# Patient Record
Sex: MALE | Race: WHITE
[De-identification: names, ages, dates, MRNs, and addresses within clinical notes are randomized per-mention and may not be internally consistent; named-entity substitution may affect disease eponyms.]

---

## 2022-03-19 PROBLEM — F84.5 ASPERGER SYNDROME: Status: ACTIVE | Noted: 2020-02-20

## 2022-03-19 PROBLEM — F95.2 TOURETTE SYNDROME: Status: ACTIVE | Noted: 2020-02-20

## 2022-09-22 DIAGNOSIS — Z13.228 SCREENING FOR METABOLIC DISORDER: ICD-10-CM

## 2022-09-22 DIAGNOSIS — Z13.6 SCREENING FOR ISCHEMIC HEART DISEASE: ICD-10-CM

## 2022-09-22 DIAGNOSIS — R94.6 NONSPECIFIC ABNORMAL RESULTS OF THYROID FUNCTION STUDY: ICD-10-CM

## 2022-09-22 DIAGNOSIS — R79.9 ABNORMAL BLOOD CHEMISTRY: ICD-10-CM

## 2022-09-22 DIAGNOSIS — Z12.5 SCREENING PSA (PROSTATE SPECIFIC ANTIGEN): ICD-10-CM

## 2022-09-22 DIAGNOSIS — Z12.5 SCREENING PSA (PROSTATE SPECIFIC ANTIGEN): Primary | ICD-10-CM

## 2022-09-22 LAB
ALBUMIN SERPL-MCNC: 4.5 G/DL (ref 3.5–5)
ALBUMIN/GLOB SERPL: 1.6 {RATIO} (ref 1.2–3.5)
ALP SERPL-CCNC: 46 U/L (ref 50–136)
ALT SERPL-CCNC: 24 U/L (ref 12–65)
ANION GAP SERPL CALC-SCNC: 4 MMOL/L (ref 4–13)
AST SERPL-CCNC: 12 U/L (ref 15–37)
BASOPHILS # BLD: 0.1 K/UL (ref 0–0.2)
BASOPHILS NFR BLD: 1 % (ref 0–2)
BILIRUB SERPL-MCNC: 0.5 MG/DL (ref 0.2–1.1)
BUN SERPL-MCNC: 12 MG/DL (ref 6–23)
CALCIUM SERPL-MCNC: 9.6 MG/DL (ref 8.3–10.4)
CHLORIDE SERPL-SCNC: 106 MMOL/L (ref 101–110)
CHOLEST SERPL-MCNC: 137 MG/DL
CO2 SERPL-SCNC: 30 MMOL/L (ref 21–32)
CREAT SERPL-MCNC: 0.6 MG/DL (ref 0.8–1.5)
DIFFERENTIAL METHOD BLD: NORMAL
EOSINOPHIL # BLD: 0.3 K/UL (ref 0–0.8)
EOSINOPHIL NFR BLD: 4 % (ref 0.5–7.8)
ERYTHROCYTE [DISTWIDTH] IN BLOOD BY AUTOMATED COUNT: 12.9 % (ref 11.9–14.6)
EST. AVERAGE GLUCOSE BLD GHB EST-MCNC: 108 MG/DL
GLOBULIN SER CALC-MCNC: 2.9 G/DL (ref 2.3–3.5)
GLUCOSE SERPL-MCNC: 113 MG/DL (ref 65–100)
HBA1C MFR BLD: 5.4 % (ref 4.8–5.6)
HCT VFR BLD AUTO: 46.1 % (ref 41.1–50.3)
HDLC SERPL-MCNC: 42 MG/DL (ref 40–60)
HDLC SERPL: 3.3 {RATIO}
HGB BLD-MCNC: 14.8 G/DL (ref 13.6–17.2)
IMM GRANULOCYTES # BLD AUTO: 0 K/UL (ref 0–0.5)
IMM GRANULOCYTES NFR BLD AUTO: 0 % (ref 0–5)
LDLC SERPL CALC-MCNC: 83.2 MG/DL
LYMPHOCYTES # BLD: 1.7 K/UL (ref 0.5–4.6)
LYMPHOCYTES NFR BLD: 25 % (ref 13–44)
MCH RBC QN AUTO: 29.5 PG (ref 26.1–32.9)
MCHC RBC AUTO-ENTMCNC: 32.1 G/DL (ref 31.4–35)
MCV RBC AUTO: 92 FL (ref 79.6–97.8)
MONOCYTES # BLD: 0.8 K/UL (ref 0.1–1.3)
MONOCYTES NFR BLD: 12 % (ref 4–12)
NEUTS SEG # BLD: 3.9 K/UL (ref 1.7–8.2)
NEUTS SEG NFR BLD: 58 % (ref 43–78)
NRBC # BLD: 0 K/UL (ref 0–0.2)
PLATELET # BLD AUTO: 190 K/UL (ref 150–450)
PMV BLD AUTO: 11.7 FL (ref 9.4–12.3)
POTASSIUM SERPL-SCNC: 4.5 MMOL/L (ref 3.5–5.1)
PROT SERPL-MCNC: 7.4 G/DL (ref 6.3–8.2)
RBC # BLD AUTO: 5.01 M/UL (ref 4.23–5.6)
SODIUM SERPL-SCNC: 140 MMOL/L (ref 138–145)
T4 FREE SERPL-MCNC: 0.9 NG/DL (ref 0.78–1.46)
TRIGL SERPL-MCNC: 59 MG/DL (ref 35–150)
TSH, 3RD GENERATION: 1.51 UIU/ML (ref 0.36–3.74)
VLDLC SERPL CALC-MCNC: 11.8 MG/DL (ref 6–23)
WBC # BLD AUTO: 6.7 K/UL (ref 4.3–11.1)

## 2022-09-23 LAB
PSA FREE MFR SERPL: 16.7 %
PSA FREE SERPL-MCNC: 0.1 NG/ML
PSA SERPL-MCNC: 0.6 NG/ML

## 2022-09-29 ENCOUNTER — OFFICE VISIT (OUTPATIENT)
Dept: PRIMARY CARE CLINIC | Facility: CLINIC | Age: 37
End: 2022-09-29
Payer: COMMERCIAL

## 2022-09-29 VITALS
BODY MASS INDEX: 32.1 KG/M2 | OXYGEN SATURATION: 96 % | HEIGHT: 70 IN | HEART RATE: 68 BPM | SYSTOLIC BLOOD PRESSURE: 111 MMHG | TEMPERATURE: 97.3 F | WEIGHT: 224.2 LBS | DIASTOLIC BLOOD PRESSURE: 80 MMHG

## 2022-09-29 DIAGNOSIS — F95.2 TOURETTE SYNDROME: ICD-10-CM

## 2022-09-29 DIAGNOSIS — F84.5 ASPERGER SYNDROME: Primary | ICD-10-CM

## 2022-09-29 PROCEDURE — 99214 OFFICE O/P EST MOD 30 MIN: CPT | Performed by: FAMILY MEDICINE

## 2022-09-29 RX ORDER — RISPERIDONE 0.5 MG/1
0.5 TABLET, FILM COATED ORAL DAILY
Qty: 30 TABLET | Refills: 11 | Status: SHIPPED | OUTPATIENT
Start: 2022-09-29

## 2022-09-29 RX ORDER — RISPERIDONE 0.5 MG/1
0.5 TABLET, FILM COATED ORAL DAILY
Qty: 30 TABLET | Refills: 5 | Status: SHIPPED | OUTPATIENT
Start: 2022-09-29 | End: 2022-09-29 | Stop reason: SDUPTHER

## 2022-09-29 RX ORDER — RISPERIDONE 1 MG/1
1 TABLET, FILM COATED ORAL DAILY
Qty: 30 TABLET | Refills: 11 | Status: SHIPPED | OUTPATIENT
Start: 2022-09-29

## 2022-09-29 RX ORDER — RISPERIDONE 1 MG/1
1 TABLET, FILM COATED ORAL DAILY
Qty: 30 TABLET | Refills: 5 | Status: SHIPPED | OUTPATIENT
Start: 2022-09-29 | End: 2022-09-29 | Stop reason: SDUPTHER

## 2022-09-29 ASSESSMENT — PATIENT HEALTH QUESTIONNAIRE - PHQ9
SUM OF ALL RESPONSES TO PHQ9 QUESTIONS 1 & 2: 0
2. FEELING DOWN, DEPRESSED OR HOPELESS: 0
SUM OF ALL RESPONSES TO PHQ QUESTIONS 1-9: 0
1. LITTLE INTEREST OR PLEASURE IN DOING THINGS: 0

## 2022-09-29 NOTE — PROGRESS NOTES
Zachariah Lozoya MD  [address] [place] [address]  Ph No:  [phone]  Fax:  [phone]    CHIEF COMPLAINT:  Chief Complaint   Patient presents with    Follow-up     Pt in for 6 month follow up. Pt has no issues today. HISTORY OF PRESENT ILLNESS:  Mr. Mehreen Pena is a 40 y.o. male. Pt is doing okay 1/2 pill in am and 1 pill, Pt says helps calm. Pt works as a  at [employer] and reports he is enjoying his job. Pt was brought back to clinic to retest of glucose and with weight loss pt has reduced the hgba1c from 6.0% to 5.4%. Pt is now living as a non-diabetic. Pt has lost to 230lbs to 220lbs. Pt is advised his other labs are normal.    Pt is advised we will see him again in 1 year. Pt is given refills on risperdal.  Pt will RTC in 1 year for recheck, lab review: cmp, lipid, cbc, tsh, free t4, hgba1c, vit d. HISTORY:  Allergies   Allergen Reactions    Latex Rash     History reviewed. No pertinent past medical history. No past surgical history on file.   Family History   Problem Relation Age of Onset    Thyroid Disease Father     Osteoarthritis Sister     Diabetes Mother     Cancer Maternal Grandmother     Diabetes Maternal Grandfather     Heart Disease Maternal Grandfather     Thyroid Disease Mother     Diabetes Paternal Grandmother      Social History     Socioeconomic History    Marital status: Single     Spouse name: Not on file    Number of children: Not on file    Years of education: Not on file    Highest education level: Not on file   Occupational History    Not on file   Tobacco Use    Smoking status: Never    Smokeless tobacco: Never   Vaping Use    Vaping Use: Never used   Substance and Sexual Activity    Alcohol use: Never    Drug use: Never    Sexual activity: Not on file   Other Topics Concern    Not on file   Social History Narrative    Not on file     Social Determinants of Health     Financial Resource Strain: Not on file   Food Insecurity: Not on file   Transportation Needs: Not on file   Physical Activity: Not on file   Stress: Not on file   Social Connections: Not on file   Intimate Partner Violence: Not on file   Housing Stability: Not on file     Current Outpatient Medications   Medication Sig Dispense Refill    risperiDONE (RISPERDAL) 1 MG tablet Take 1 tablet by mouth daily (Pt takes 1mg at night) 30 tablet 11    risperiDONE (RISPERDAL) 0.5 MG tablet Take 1 tablet by mouth daily (Pt takes 0.5 mg is am) 30 tablet 11     No current facility-administered medications for this visit. REVIEW OF SYSTEMS:  Review of systems is as indicated in HPI otherwise negative. PHYSICAL EXAM:  Vital Signs - /80 (Site: Left Upper Arm, Position: Sitting, Cuff Size: Large Adult)   Pulse 68   Temp 97.3 °F (36.3 °C) (Temporal)   Ht 5' 9.5\" (1.765 m)   Wt 224 lb 3.2 oz (101.7 kg)   SpO2 96%   BMI 32.63 kg/m²      Physical Exam  Vitals and nursing note reviewed. Constitutional:       Appearance: Normal appearance. He is obese. Comments: Aspergers syndrome, tourette's. HENT:      Head: Normocephalic and atraumatic. Nose: Nose normal.      Mouth/Throat:      Mouth: Mucous membranes are moist.      Pharynx: Oropharynx is clear. Eyes:      Extraocular Movements: Extraocular movements intact. Conjunctiva/sclera: Conjunctivae normal.      Pupils: Pupils are equal, round, and reactive to light. Cardiovascular:      Rate and Rhythm: Normal rate and regular rhythm. Pulses: Normal pulses. Heart sounds: Normal heart sounds. Pulmonary:      Effort: Pulmonary effort is normal.      Breath sounds: Normal breath sounds. Abdominal:      General: Bowel sounds are normal.      Palpations: Abdomen is soft. Comments: Obese, BMI 32.63, weight 224lbs   Musculoskeletal:         General: Normal range of motion. Cervical back: Normal range of motion and neck supple.    Skin:     General: Skin is warm and dry. Capillary Refill: Capillary refill takes less than 2 seconds. Neurological:      General: No focal deficit present. Mental Status: He is alert and oriented to person, place, and time. Comments: No evidence of manifestation of Tourette's during today's exam.   Psychiatric:         Mood and Affect: Mood normal.         Behavior: Behavior normal.         Thought Content: Thought content normal.         Judgment: Judgment normal.      Comments: Aspergers Syndrome. LABS  No results found for this visit on 09/29/22. IMPRESSION/PLAN     Diagnosis Orders   1. Asperger syndrome  risperiDONE (RISPERDAL) 1 MG tablet    risperiDONE (RISPERDAL) 0.5 MG tablet    DISCONTINUED: risperiDONE (RISPERDAL) 1 MG tablet    DISCONTINUED: risperiDONE (RISPERDAL) 0.5 MG tablet      2. Tourette syndrome  risperiDONE (RISPERDAL) 1 MG tablet    risperiDONE (RISPERDAL) 0.5 MG tablet    DISCONTINUED: risperiDONE (RISPERDAL) 1 MG tablet    DISCONTINUED: risperiDONE (RISPERDAL) 0.5 MG tablet          No follow-up provider specified. Laura Ferrer MD            Dictated using voice recognition software.  Proofread, but unrecognized voice recognition errors may exist.

## 2023-09-25 ENCOUNTER — NURSE ONLY (OUTPATIENT)
Dept: PRIMARY CARE CLINIC | Facility: CLINIC | Age: 38
End: 2023-09-25

## 2023-09-25 DIAGNOSIS — Z79.899 ENCOUNTER FOR LONG-TERM (CURRENT) USE OF MEDICATIONS: Primary | ICD-10-CM

## 2023-09-25 DIAGNOSIS — Z79.899 ENCOUNTER FOR LONG-TERM (CURRENT) USE OF MEDICATIONS: ICD-10-CM

## 2023-09-25 LAB
ALBUMIN SERPL-MCNC: 4.3 G/DL (ref 3.5–5)
ALBUMIN/GLOB SERPL: 1.3 (ref 0.4–1.6)
ALP SERPL-CCNC: 53 U/L (ref 50–136)
ALT SERPL-CCNC: 31 U/L (ref 12–65)
ANION GAP SERPL CALC-SCNC: 3 MMOL/L (ref 2–11)
AST SERPL-CCNC: 22 U/L (ref 15–37)
BASOPHILS # BLD: 0.1 K/UL (ref 0–0.2)
BASOPHILS NFR BLD: 1 % (ref 0–2)
BILIRUB SERPL-MCNC: 0.2 MG/DL (ref 0.2–1.1)
BUN SERPL-MCNC: 11 MG/DL (ref 6–23)
CALCIUM SERPL-MCNC: 8.9 MG/DL (ref 8.3–10.4)
CHLORIDE SERPL-SCNC: 110 MMOL/L (ref 101–110)
CHOLEST SERPL-MCNC: 129 MG/DL
CO2 SERPL-SCNC: 30 MMOL/L (ref 21–32)
CREAT SERPL-MCNC: 0.7 MG/DL (ref 0.8–1.5)
DIFFERENTIAL METHOD BLD: NORMAL
EOSINOPHIL # BLD: 0.4 K/UL (ref 0–0.8)
EOSINOPHIL NFR BLD: 5 % (ref 0.5–7.8)
ERYTHROCYTE [DISTWIDTH] IN BLOOD BY AUTOMATED COUNT: 12.4 % (ref 11.9–14.6)
EST. AVERAGE GLUCOSE BLD GHB EST-MCNC: 111 MG/DL
GLOBULIN SER CALC-MCNC: 3.4 G/DL (ref 2.8–4.5)
GLUCOSE SERPL-MCNC: 125 MG/DL (ref 65–100)
HBA1C MFR BLD: 5.5 % (ref 4.8–5.6)
HCT VFR BLD AUTO: 46.4 % (ref 41.1–50.3)
HDLC SERPL-MCNC: 41 MG/DL (ref 40–60)
HDLC SERPL: 3.1
HGB BLD-MCNC: 15.1 G/DL (ref 13.6–17.2)
IMM GRANULOCYTES # BLD AUTO: 0 K/UL (ref 0–0.5)
IMM GRANULOCYTES NFR BLD AUTO: 0 % (ref 0–5)
LDLC SERPL CALC-MCNC: 68 MG/DL
LYMPHOCYTES # BLD: 2.2 K/UL (ref 0.5–4.6)
LYMPHOCYTES NFR BLD: 27 % (ref 13–44)
MCH RBC QN AUTO: 29.3 PG (ref 26.1–32.9)
MCHC RBC AUTO-ENTMCNC: 32.5 G/DL (ref 31.4–35)
MCV RBC AUTO: 89.9 FL (ref 82–102)
MONOCYTES # BLD: 0.9 K/UL (ref 0.1–1.3)
MONOCYTES NFR BLD: 11 % (ref 4–12)
NEUTS SEG # BLD: 4.3 K/UL (ref 1.7–8.2)
NEUTS SEG NFR BLD: 56 % (ref 43–78)
NRBC # BLD: 0 K/UL (ref 0–0.2)
PLATELET # BLD AUTO: 186 K/UL (ref 150–450)
PMV BLD AUTO: 11.2 FL (ref 9.4–12.3)
POTASSIUM SERPL-SCNC: 4.2 MMOL/L (ref 3.5–5.1)
PROT SERPL-MCNC: 7.7 G/DL (ref 6.3–8.2)
RBC # BLD AUTO: 5.16 M/UL (ref 4.23–5.6)
SODIUM SERPL-SCNC: 143 MMOL/L (ref 133–143)
T4 FREE SERPL-MCNC: 0.9 NG/DL (ref 0.78–1.46)
TRIGL SERPL-MCNC: 100 MG/DL (ref 35–150)
TSH, 3RD GENERATION: 3.72 UIU/ML (ref 0.36–3.74)
VLDLC SERPL CALC-MCNC: 20 MG/DL (ref 6–23)
WBC # BLD AUTO: 7.9 K/UL (ref 4.3–11.1)

## 2023-09-26 LAB — 25(OH)D3 SERPL-MCNC: 24.2 NG/ML (ref 30–100)

## 2023-09-30 ASSESSMENT — PATIENT HEALTH QUESTIONNAIRE - PHQ9
SUM OF ALL RESPONSES TO PHQ9 QUESTIONS 1 & 2: 0
SUM OF ALL RESPONSES TO PHQ9 QUESTIONS 1 & 2: 0
SUM OF ALL RESPONSES TO PHQ QUESTIONS 1-9: 0
1. LITTLE INTEREST OR PLEASURE IN DOING THINGS: NOT AT ALL
SUM OF ALL RESPONSES TO PHQ QUESTIONS 1-9: 0
2. FEELING DOWN, DEPRESSED OR HOPELESS: NOT AT ALL
SUM OF ALL RESPONSES TO PHQ QUESTIONS 1-9: 0
2. FEELING DOWN, DEPRESSED OR HOPELESS: 0
1. LITTLE INTEREST OR PLEASURE IN DOING THINGS: 0
SUM OF ALL RESPONSES TO PHQ QUESTIONS 1-9: 0

## 2023-10-01 DIAGNOSIS — E55.9 VITAMIN D DEFICIENCY: Primary | ICD-10-CM

## 2023-10-01 RX ORDER — ERGOCALCIFEROL 1.25 MG/1
50000 CAPSULE ORAL WEEKLY
Qty: 12 CAPSULE | Refills: 1 | Status: SHIPPED | OUTPATIENT
Start: 2023-10-01

## 2023-10-02 ENCOUNTER — OFFICE VISIT (OUTPATIENT)
Dept: PRIMARY CARE CLINIC | Facility: CLINIC | Age: 38
End: 2023-10-02
Payer: COMMERCIAL

## 2023-10-02 VITALS
WEIGHT: 226.4 LBS | HEART RATE: 71 BPM | DIASTOLIC BLOOD PRESSURE: 84 MMHG | TEMPERATURE: 98 F | BODY MASS INDEX: 32.41 KG/M2 | OXYGEN SATURATION: 94 % | SYSTOLIC BLOOD PRESSURE: 125 MMHG | HEIGHT: 70 IN

## 2023-10-02 DIAGNOSIS — E55.9 VITAMIN D DEFICIENCY: ICD-10-CM

## 2023-10-02 DIAGNOSIS — F95.2 TOURETTE SYNDROME: ICD-10-CM

## 2023-10-02 DIAGNOSIS — F84.5 ASPERGER SYNDROME: Primary | ICD-10-CM

## 2023-10-02 PROCEDURE — 99395 PREV VISIT EST AGE 18-39: CPT | Performed by: FAMILY MEDICINE

## 2023-10-02 RX ORDER — RISPERIDONE 1 MG/1
1 TABLET ORAL DAILY
Qty: 30 TABLET | Refills: 11 | Status: SHIPPED | OUTPATIENT
Start: 2023-10-02

## 2023-10-02 RX ORDER — ERGOCALCIFEROL 1.25 MG/1
50000 CAPSULE ORAL WEEKLY
Qty: 4 CAPSULE | Refills: 3 | Status: SHIPPED | OUTPATIENT
Start: 2023-10-02

## 2023-10-02 RX ORDER — RISPERIDONE 0.5 MG/1
0.5 TABLET ORAL DAILY
Qty: 30 TABLET | Refills: 11 | Status: SHIPPED | OUTPATIENT
Start: 2023-10-02

## 2023-10-02 NOTE — PROGRESS NOTES
Josselin Park MD  [address] [place], [address] [facility]  Ph No:  [phone]  Fax:  [phone]:  Chief Complaint   Patient presents with    Annual Exam     Pt in for 1 year physical. Pt has no issues today. HISTORY OF PRESENT ILLNESS:  Mr. Gianna Kenney is a 41 y.o. male    Pt reports he lives at home with his parents. Asperger Syndrome with Tourette's. Pt is taking Risperdal 0.5 mg in am and 1.0 mg at night. Pt reports he is doing well on current medications. Labs are very good, pt is given vit d to take once daily for vit d deficiency. Risperdal refilled as indicated. PE is normal.  Pt advised to continue current medications, to maintain his healthy diet and lifestyle, to stay active and to enjoy life as he is able. Pt radha RTC in 1 year, recheck. HISTORY:  Allergies   Allergen Reactions    Latex Rash     History reviewed. No pertinent past medical history. No past surgical history on file.   Family History   Problem Relation Age of Onset    Thyroid Disease Father     Osteoarthritis Sister     Diabetes Mother     Cancer Maternal Grandmother     Diabetes Maternal Grandfather     Heart Disease Maternal Grandfather     Thyroid Disease Mother     Diabetes Paternal Grandmother      Social History     Socioeconomic History    Marital status: Single     Spouse name: Not on file    Number of children: Not on file    Years of education: Not on file    Highest education level: Not on file   Occupational History    Not on file   Tobacco Use    Smoking status: Never    Smokeless tobacco: Never   Vaping Use    Vaping Use: Never used   Substance and Sexual Activity    Alcohol use: Never    Drug use: Never    Sexual activity: Not on file   Other Topics Concern    Not on file   Social History Narrative    Not on file     Social Determinants of Health     Financial Resource Strain: Not on file   Food Insecurity: Not on file   Transportation Needs: Not on

## 2024-05-24 ENCOUNTER — OFFICE VISIT (OUTPATIENT)
Dept: FAMILY MEDICINE CLINIC | Facility: CLINIC | Age: 39
End: 2024-05-24
Payer: COMMERCIAL

## 2024-05-24 VITALS
TEMPERATURE: 98 F | WEIGHT: 230 LBS | SYSTOLIC BLOOD PRESSURE: 132 MMHG | HEIGHT: 70 IN | OXYGEN SATURATION: 98 % | BODY MASS INDEX: 32.93 KG/M2 | DIASTOLIC BLOOD PRESSURE: 82 MMHG | HEART RATE: 86 BPM

## 2024-05-24 DIAGNOSIS — L20.9 ATOPIC DERMATITIS, UNSPECIFIED TYPE: Primary | ICD-10-CM

## 2024-05-24 PROCEDURE — 99213 OFFICE O/P EST LOW 20 MIN: CPT | Performed by: NURSE PRACTITIONER

## 2024-05-24 RX ORDER — TRIAMCINOLONE ACETONIDE 1 MG/G
CREAM TOPICAL
Qty: 30 G | Refills: 1 | Status: SHIPPED | OUTPATIENT
Start: 2024-05-24

## 2024-05-24 SDOH — ECONOMIC STABILITY: HOUSING INSECURITY
IN THE LAST 12 MONTHS, WAS THERE A TIME WHEN YOU DID NOT HAVE A STEADY PLACE TO SLEEP OR SLEPT IN A SHELTER (INCLUDING NOW)?: NO

## 2024-05-24 SDOH — ECONOMIC STABILITY: FOOD INSECURITY: WITHIN THE PAST 12 MONTHS, YOU WORRIED THAT YOUR FOOD WOULD RUN OUT BEFORE YOU GOT MONEY TO BUY MORE.: NEVER TRUE

## 2024-05-24 SDOH — ECONOMIC STABILITY: INCOME INSECURITY: HOW HARD IS IT FOR YOU TO PAY FOR THE VERY BASICS LIKE FOOD, HOUSING, MEDICAL CARE, AND HEATING?: NOT HARD AT ALL

## 2024-05-24 SDOH — ECONOMIC STABILITY: FOOD INSECURITY: WITHIN THE PAST 12 MONTHS, THE FOOD YOU BOUGHT JUST DIDN'T LAST AND YOU DIDN'T HAVE MONEY TO GET MORE.: NEVER TRUE

## 2024-05-24 ASSESSMENT — PATIENT HEALTH QUESTIONNAIRE - PHQ9
1. LITTLE INTEREST OR PLEASURE IN DOING THINGS: NOT AT ALL
2. FEELING DOWN, DEPRESSED OR HOPELESS: NOT AT ALL
SUM OF ALL RESPONSES TO PHQ QUESTIONS 1-9: 0
SUM OF ALL RESPONSES TO PHQ9 QUESTIONS 1 & 2: 0
SUM OF ALL RESPONSES TO PHQ QUESTIONS 1-9: 0

## 2024-05-24 NOTE — PROGRESS NOTES
[facility]  [address] [place] [address]  Phone [phone]  Fax:  [phone]    Patient: Juan Pak  YOB: 1985  Patient Age 39 y.o.  Patient sex: male  Medical Record:  [id number]  Visit Date: 05/24/24    [facility] Clinic Note     Chief Complaint   Patient presents with    Rash     RLE 1 year off and on   No itch or burn  Red  Using cortisone 10       History of Present Illness:  Mr. Pak is a 39 year old male with a past medical history as noted below who presents for an acute visit. Patient is a high functioning Asperger's autistic male, accompanied by this Mother. He states rash to right lower leg, left forearm (clearing up) on and off for a year. Rash area to right lower leg with dry, at times itchy.  Has no other associated symptoms, no new products or medications, no new exposures, no contacts with same type of rash.  Has been using hydrocortisone with little relief.       Rash  This is a recurrent problem. The current episode started more than 1 month ago. The problem has been waxing and waning since onset. Location: Right lower leg, area clearing up on left forearm.       Allergies:  Allergies   Allergen Reactions    Latex Rash       Current Medications:   Medications marked \"taking\" at this time:    Current Outpatient Medications:     triamcinolone (KENALOG) 0.1 % cream, Apply topically 2 times daily., Disp: 30 g, Rfl: 1    vitamin D (ERGOCALCIFEROL) 1.25 MG (68154 UT) CAPS capsule, Take 1 capsule by mouth once a week, Disp: 4 capsule, Rfl: 3    risperiDONE (RISPERDAL) 1 MG tablet, Take 1 tablet by mouth daily (Pt takes 1mg at night), Disp: 30 tablet, Rfl: 11    risperiDONE (RISPERDAL) 0.5 MG tablet, Take 1 tablet by mouth daily (Pt takes 0.5 mg is am), Disp: 30 tablet, Rfl: 11    Current Problem List:   Patient Active Problem List   Diagnosis    Asperger syndrome    Tourette syndrome    Vitamin D deficiency       Social History:   Social History

## 2024-09-26 DIAGNOSIS — R53.83 OTHER FATIGUE: ICD-10-CM

## 2024-09-26 DIAGNOSIS — R53.81 MALAISE AND FATIGUE: ICD-10-CM

## 2024-09-26 DIAGNOSIS — Z13.228 SCREENING FOR METABOLIC DISORDER: ICD-10-CM

## 2024-09-26 DIAGNOSIS — Z13.29 SCREENING FOR THYROID DISORDER: ICD-10-CM

## 2024-09-26 DIAGNOSIS — Z13.220 SCREENING FOR LIPID DISORDERS: ICD-10-CM

## 2024-09-26 DIAGNOSIS — Z12.5 SCREENING FOR PROSTATE CANCER: ICD-10-CM

## 2024-09-26 DIAGNOSIS — Z13.21 ENCOUNTER FOR VITAMIN DEFICIENCY SCREENING: ICD-10-CM

## 2024-09-26 DIAGNOSIS — R73.09 OTHER ABNORMAL GLUCOSE: ICD-10-CM

## 2024-09-26 DIAGNOSIS — Z13.21 SCREENING FOR ENDOCRINE, NUTRITIONAL, METABOLIC AND IMMUNITY DISORDER: ICD-10-CM

## 2024-09-26 DIAGNOSIS — Z13.1 SCREENING FOR DIABETES MELLITUS: Primary | ICD-10-CM

## 2024-09-26 DIAGNOSIS — R53.81 MALAISE: ICD-10-CM

## 2024-09-26 DIAGNOSIS — R53.83 MALAISE AND FATIGUE: ICD-10-CM

## 2024-09-26 DIAGNOSIS — Z13.29 SCREENING FOR ENDOCRINE, NUTRITIONAL, METABOLIC AND IMMUNITY DISORDER: ICD-10-CM

## 2024-09-26 DIAGNOSIS — R79.89 OTHER SPECIFIED ABNORMAL FINDINGS OF BLOOD CHEMISTRY: ICD-10-CM

## 2024-09-26 DIAGNOSIS — Z79.899 ENCOUNTER FOR LONG-TERM (CURRENT) USE OF MEDICATIONS: ICD-10-CM

## 2024-09-26 DIAGNOSIS — Z13.228 SCREENING FOR ENDOCRINE, NUTRITIONAL, METABOLIC AND IMMUNITY DISORDER: ICD-10-CM

## 2024-09-26 DIAGNOSIS — Z13.0 SCREENING FOR ENDOCRINE, NUTRITIONAL, METABOLIC AND IMMUNITY DISORDER: ICD-10-CM

## 2024-09-26 DIAGNOSIS — Z13.0 SCREENING FOR DEFICIENCY ANEMIA: ICD-10-CM

## 2024-09-26 DIAGNOSIS — R53.82 CHRONIC FATIGUE: ICD-10-CM

## 2024-09-26 DIAGNOSIS — E55.9 VITAMIN D DEFICIENCY: ICD-10-CM

## 2024-09-26 DIAGNOSIS — R79.9 ABNORMAL BLOOD CHEMISTRY: ICD-10-CM

## 2024-10-04 ENCOUNTER — OFFICE VISIT (OUTPATIENT)
Dept: PRIMARY CARE CLINIC | Facility: CLINIC | Age: 39
End: 2024-10-04

## 2024-10-04 VITALS
HEIGHT: 70 IN | WEIGHT: 235 LBS | SYSTOLIC BLOOD PRESSURE: 133 MMHG | HEART RATE: 66 BPM | OXYGEN SATURATION: 97 % | DIASTOLIC BLOOD PRESSURE: 88 MMHG | TEMPERATURE: 98.1 F | BODY MASS INDEX: 33.64 KG/M2

## 2024-10-04 DIAGNOSIS — Z12.5 SCREENING FOR PROSTATE CANCER: ICD-10-CM

## 2024-10-04 DIAGNOSIS — Z13.21 ENCOUNTER FOR VITAMIN DEFICIENCY SCREENING: ICD-10-CM

## 2024-10-04 DIAGNOSIS — R53.81 MALAISE: ICD-10-CM

## 2024-10-04 DIAGNOSIS — Z13.228 SCREENING FOR ENDOCRINE, NUTRITIONAL, METABOLIC AND IMMUNITY DISORDER: ICD-10-CM

## 2024-10-04 DIAGNOSIS — R53.81 MALAISE AND FATIGUE: ICD-10-CM

## 2024-10-04 DIAGNOSIS — Z79.899 ENCOUNTER FOR LONG-TERM (CURRENT) USE OF MEDICATIONS: ICD-10-CM

## 2024-10-04 DIAGNOSIS — Z13.29 SCREENING FOR ENDOCRINE, NUTRITIONAL, METABOLIC AND IMMUNITY DISORDER: ICD-10-CM

## 2024-10-04 DIAGNOSIS — Z13.0 SCREENING FOR DEFICIENCY ANEMIA: ICD-10-CM

## 2024-10-04 DIAGNOSIS — R73.09 OTHER ABNORMAL GLUCOSE: ICD-10-CM

## 2024-10-04 DIAGNOSIS — R53.82 CHRONIC FATIGUE: ICD-10-CM

## 2024-10-04 DIAGNOSIS — F95.2 TOURETTE SYNDROME: ICD-10-CM

## 2024-10-04 DIAGNOSIS — E55.9 VITAMIN D DEFICIENCY: ICD-10-CM

## 2024-10-04 DIAGNOSIS — R79.0 LOW MAGNESIUM LEVEL: ICD-10-CM

## 2024-10-04 DIAGNOSIS — Z13.0 SCREENING FOR ENDOCRINE, NUTRITIONAL, METABOLIC AND IMMUNITY DISORDER: ICD-10-CM

## 2024-10-04 DIAGNOSIS — R53.83 OTHER FATIGUE: ICD-10-CM

## 2024-10-04 DIAGNOSIS — Z13.1 SCREENING FOR DIABETES MELLITUS: ICD-10-CM

## 2024-10-04 DIAGNOSIS — Z13.29 SCREENING FOR THYROID DISORDER: ICD-10-CM

## 2024-10-04 DIAGNOSIS — R79.89 OTHER SPECIFIED ABNORMAL FINDINGS OF BLOOD CHEMISTRY: ICD-10-CM

## 2024-10-04 DIAGNOSIS — Z13.228 SCREENING FOR METABOLIC DISORDER: ICD-10-CM

## 2024-10-04 DIAGNOSIS — Z13.220 SCREENING FOR LIPID DISORDERS: ICD-10-CM

## 2024-10-04 DIAGNOSIS — R53.83 MALAISE AND FATIGUE: ICD-10-CM

## 2024-10-04 DIAGNOSIS — F84.5 ASPERGER SYNDROME: ICD-10-CM

## 2024-10-04 DIAGNOSIS — R79.9 ABNORMAL BLOOD CHEMISTRY: ICD-10-CM

## 2024-10-04 DIAGNOSIS — Z13.21 SCREENING FOR ENDOCRINE, NUTRITIONAL, METABOLIC AND IMMUNITY DISORDER: ICD-10-CM

## 2024-10-04 DIAGNOSIS — Z00.00 ENCOUNTER FOR WELL ADULT EXAM WITHOUT ABNORMAL FINDINGS: Primary | ICD-10-CM

## 2024-10-04 LAB
25(OH)D3 SERPL-MCNC: 26 NG/ML (ref 30–100)
ALBUMIN SERPL-MCNC: 4.5 G/DL (ref 3.5–5)
ALBUMIN/GLOB SERPL: 1.6 (ref 1–1.9)
ALP SERPL-CCNC: 53 U/L (ref 40–129)
ALT SERPL-CCNC: 28 U/L (ref 8–55)
ANION GAP SERPL CALC-SCNC: 12 MMOL/L (ref 9–18)
AST SERPL-CCNC: 22 U/L (ref 15–37)
BASOPHILS # BLD: 0.1 K/UL (ref 0–0.2)
BASOPHILS NFR BLD: 1 % (ref 0–2)
BILIRUB SERPL-MCNC: 0.3 MG/DL (ref 0–1.2)
BUN SERPL-MCNC: 8 MG/DL (ref 6–23)
CALCIUM SERPL-MCNC: 9.7 MG/DL (ref 8.8–10.2)
CHLORIDE SERPL-SCNC: 101 MMOL/L (ref 98–107)
CHOLEST SERPL-MCNC: 146 MG/DL (ref 0–200)
CO2 SERPL-SCNC: 28 MMOL/L (ref 20–28)
CREAT SERPL-MCNC: 0.64 MG/DL (ref 0.8–1.3)
DIFFERENTIAL METHOD BLD: NORMAL
EOSINOPHIL # BLD: 0.3 K/UL (ref 0–0.8)
EOSINOPHIL NFR BLD: 4 % (ref 0.5–7.8)
ERYTHROCYTE [DISTWIDTH] IN BLOOD BY AUTOMATED COUNT: 12.4 % (ref 11.9–14.6)
EST. AVERAGE GLUCOSE BLD GHB EST-MCNC: 125 MG/DL
GLOBULIN SER CALC-MCNC: 2.9 G/DL (ref 2.3–3.5)
GLUCOSE SERPL-MCNC: 110 MG/DL (ref 70–99)
HBA1C MFR BLD: 6 % (ref 0–5.6)
HCT VFR BLD AUTO: 45.7 % (ref 41.1–50.3)
HDLC SERPL-MCNC: 42 MG/DL (ref 40–60)
HDLC SERPL: 3.5 (ref 0–5)
HGB BLD-MCNC: 14.9 G/DL (ref 13.6–17.2)
IMM GRANULOCYTES # BLD AUTO: 0 K/UL (ref 0–0.5)
IMM GRANULOCYTES NFR BLD AUTO: 0 % (ref 0–5)
LDLC SERPL CALC-MCNC: 81 MG/DL (ref 0–100)
LYMPHOCYTES # BLD: 2 K/UL (ref 0.5–4.6)
LYMPHOCYTES NFR BLD: 25 % (ref 13–44)
MAGNESIUM SERPL-MCNC: 1.7 MG/DL (ref 1.8–2.4)
MCH RBC QN AUTO: 29.2 PG (ref 26.1–32.9)
MCHC RBC AUTO-ENTMCNC: 32.6 G/DL (ref 31.4–35)
MCV RBC AUTO: 89.4 FL (ref 82–102)
MONOCYTES # BLD: 0.9 K/UL (ref 0.1–1.3)
MONOCYTES NFR BLD: 11 % (ref 4–12)
NEUTS SEG # BLD: 4.7 K/UL (ref 1.7–8.2)
NEUTS SEG NFR BLD: 59 % (ref 43–78)
NRBC # BLD: 0 K/UL (ref 0–0.2)
PLATELET # BLD AUTO: 197 K/UL (ref 150–450)
PMV BLD AUTO: 11.7 FL (ref 9.4–12.3)
POTASSIUM SERPL-SCNC: 4.2 MMOL/L (ref 3.5–5.1)
PROT SERPL-MCNC: 7.3 G/DL (ref 6.3–8.2)
PSA SERPL-MCNC: 0.6 NG/ML (ref 0–4)
RBC # BLD AUTO: 5.11 M/UL (ref 4.23–5.6)
SODIUM SERPL-SCNC: 141 MMOL/L (ref 136–145)
TRIGL SERPL-MCNC: 116 MG/DL (ref 0–150)
TSH W FREE THYROID IF ABNORMAL: 2.64 UIU/ML (ref 0.27–4.2)
VIT B12 SERPL-MCNC: 413 PG/ML (ref 193–986)
VLDLC SERPL CALC-MCNC: 23 MG/DL (ref 6–23)
WBC # BLD AUTO: 8 K/UL (ref 4.3–11.1)

## 2024-10-04 RX ORDER — MULTIVIT-MIN/IRON/FOLIC ACID/K 18-600-40
2000 CAPSULE ORAL DAILY
COMMUNITY

## 2024-10-04 RX ORDER — RISPERIDONE 1 MG/1
1 TABLET ORAL DAILY
Qty: 90 TABLET | Refills: 1 | Status: SHIPPED | OUTPATIENT
Start: 2024-10-04

## 2024-10-04 RX ORDER — RISPERIDONE 0.5 MG/1
0.5 TABLET ORAL DAILY
Qty: 90 TABLET | Refills: 1 | Status: SHIPPED | OUTPATIENT
Start: 2024-10-04

## 2024-10-04 ASSESSMENT — ENCOUNTER SYMPTOMS
RESPIRATORY NEGATIVE: 1
ALLERGIC/IMMUNOLOGIC NEGATIVE: 1
ABDOMINAL PAIN: 0
EYES NEGATIVE: 1
ABDOMINAL DISTENTION: 0

## 2024-10-04 NOTE — PROGRESS NOTES
.  
(RISPERDAL) 1 MG tablet Take 1 tablet by mouth daily (Pt takes 1mg at night) Yes Neeta Antony APRN - NP   risperiDONE (RISPERDAL) 0.5 MG tablet Take 1 tablet by mouth daily (Pt takes 0.5 mg is am) Yes Neeta Antony APRN - NP   triamcinolone (KENALOG) 0.1 % cream Apply topically 2 times daily. Yes Leny Almazan APRN - NP     History reviewed. No pertinent past medical history.  History reviewed. No pertinent surgical history.  Family History   Problem Relation Age of Onset    Thyroid Disease Father     Osteoarthritis Sister     Diabetes Mother     Cancer Maternal Grandmother     Diabetes Maternal Grandfather     Heart Disease Maternal Grandfather     Thyroid Disease Mother     Diabetes Paternal Grandmother      Social History     Tobacco Use    Smoking status: Never    Smokeless tobacco: Never   Vaping Use    Vaping status: Never Used   Substance Use Topics    Alcohol use: Never    Drug use: Never           Objective   Vital Signs  /88   Pulse 66   Temp 98.1 °F (36.7 °C) (Temporal)   Ht 1.765 m (5' 9.5\")   Wt 106.6 kg (235 lb)   SpO2 97%   BMI 34.21 kg/m²     Wt Readings from Last 3 Encounters:   10/04/24 106.6 kg (235 lb)   05/24/24 104.3 kg (230 lb)   10/02/23 102.7 kg (226 lb 6.4 oz)       Physical Exam  Vitals and nursing note reviewed.   Constitutional:       General: He is not in acute distress.     Appearance: Normal appearance. He is not ill-appearing or toxic-appearing.      Comments: - Physical Examination:    - General appearance indicates the patient is in no acute distress.    - Musculoskeletal examination reveals no complaints of neck or back pain.    - Gastrointestinal assessment shows no reported stomach pain.    - Allergy evaluation indicates the patient is tolerating seasonal allergens without acute issues.   HENT:      Right Ear: External ear normal.      Left Ear: External ear normal.      Nose: Nose normal.   Eyes:      Pupils: Pupils are equal, round, and reactive to light.

## 2024-10-06 NOTE — RESULT ENCOUNTER NOTE
Please let the patient know:    Vitamin D is low at 26.0 compared to 24.0 about 1 year ago.  Recommend supplementation    CMP shows elevated glucose at 110 compared to 125 about 1 year ago  Creatinine is low at 0.64 compared to 0.70 about 1 year ago. we will continue to monitor this level.    Magnesium is low at 1.7.  No comparison is available.  Recommend supplementation    A1c is elevated at 6.0.  Recommend lifestyle and dietary changes to help improve this level since it has increased from 5.5 about 1 year ago    Lipid panel shows normal cholesterol at 146  Triglycerides are normal at 116  HDL is normal at 42  LDL is normal at 81    TSH is normal at 2.64    Vitamin B12 is normal at 413    PSA is normal at 0.6    CBC is normal    [employer] message sent as well    Explanatory note: Be assured that the information provided to create your medical record comes from your provider. The written transcription portion of this note is prepared electronically by voice-recognition software. At times, there may be some errors in capitalization, punctuation, tense, or context that are inherent in the system, but your provider reviews the note for content and to ensure that the note contains appropriate information for your continuing care.

## 2024-10-09 DIAGNOSIS — E55.9 VITAMIN D DEFICIENCY: Primary | ICD-10-CM

## 2024-10-09 DIAGNOSIS — R79.0 LOW MAGNESIUM LEVEL: ICD-10-CM

## 2024-10-09 RX ORDER — FOLIC ACID 0.8 MG
500 TABLET ORAL DAILY
Qty: 30 CAPSULE | Refills: 5 | Status: SHIPPED | OUTPATIENT
Start: 2024-10-09

## 2024-10-09 RX ORDER — ERGOCALCIFEROL 1.25 MG/1
50000 CAPSULE, LIQUID FILLED ORAL WEEKLY
Qty: 12 CAPSULE | Refills: 1 | Status: SHIPPED | OUTPATIENT
Start: 2024-10-09

## 2024-10-10 ENCOUNTER — TELEPHONE (OUTPATIENT)
Dept: PRIMARY CARE CLINIC | Facility: CLINIC | Age: 39
End: 2024-10-10

## 2025-02-04 ENCOUNTER — OFFICE VISIT (OUTPATIENT)
Dept: PRIMARY CARE CLINIC | Facility: CLINIC | Age: 40
End: 2025-02-04
Payer: COMMERCIAL

## 2025-02-04 VITALS
DIASTOLIC BLOOD PRESSURE: 86 MMHG | WEIGHT: 227 LBS | OXYGEN SATURATION: 96 % | HEART RATE: 78 BPM | BODY MASS INDEX: 33.04 KG/M2 | TEMPERATURE: 98.1 F | SYSTOLIC BLOOD PRESSURE: 138 MMHG

## 2025-02-04 DIAGNOSIS — R21 RASH: Primary | ICD-10-CM

## 2025-02-04 PROCEDURE — 99213 OFFICE O/P EST LOW 20 MIN: CPT | Performed by: NURSE PRACTITIONER

## 2025-02-04 RX ORDER — CLOBETASOL PROPIONATE 0.5 MG/G
OINTMENT TOPICAL
Qty: 15 G | Refills: 1 | Status: SHIPPED | OUTPATIENT
Start: 2025-02-04

## 2025-02-04 RX ORDER — TACROLIMUS 0.3 MG/G
OINTMENT TOPICAL
Qty: 60 G | Refills: 1 | Status: SHIPPED | OUTPATIENT
Start: 2025-02-04

## 2025-02-04 SDOH — ECONOMIC STABILITY: FOOD INSECURITY: WITHIN THE PAST 12 MONTHS, YOU WORRIED THAT YOUR FOOD WOULD RUN OUT BEFORE YOU GOT MONEY TO BUY MORE.: NEVER TRUE

## 2025-02-04 SDOH — ECONOMIC STABILITY: FOOD INSECURITY: WITHIN THE PAST 12 MONTHS, THE FOOD YOU BOUGHT JUST DIDN'T LAST AND YOU DIDN'T HAVE MONEY TO GET MORE.: NEVER TRUE

## 2025-02-04 ASSESSMENT — PATIENT HEALTH QUESTIONNAIRE - PHQ9
2. FEELING DOWN, DEPRESSED OR HOPELESS: NOT AT ALL
SUM OF ALL RESPONSES TO PHQ QUESTIONS 1-9: 0
SUM OF ALL RESPONSES TO PHQ QUESTIONS 1-9: 0
SUM OF ALL RESPONSES TO PHQ9 QUESTIONS 1 & 2: 0
1. LITTLE INTEREST OR PLEASURE IN DOING THINGS: NOT AT ALL
SUM OF ALL RESPONSES TO PHQ QUESTIONS 1-9: 0
SUM OF ALL RESPONSES TO PHQ QUESTIONS 1-9: 0

## 2025-02-04 ASSESSMENT — ENCOUNTER SYMPTOMS
ABDOMINAL PAIN: 0
RESPIRATORY NEGATIVE: 1
EYES NEGATIVE: 1
ABDOMINAL DISTENTION: 0
ALLERGIC/IMMUNOLOGIC NEGATIVE: 1

## 2025-02-04 NOTE — ASSESSMENT & PLAN NOTE
Will try  -     tacrolimus (PROTOPIC) 0.03 % ointment; Apply topically 2 times daily., Disp-60 g, R-1, Normal  -     External Referral to Dermatology  -     clobetasol (TEMOVATE) 0.05 % ointment; Apply topically 2 times daily., Disp-15 g, R-1, Normal     Rash: Chronic.  Possible eczema or psoriasis. Potential polyethylene allergy. Psoriatic arthritis less likely due to absence of joint pain. 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 Chronic irritation or autoimmune response possible, given family history of psoriatic arthritis.  - Clean chair and handle chemicals cautiously  - Take vitamin D supplements and apply vitamin D ointment  - Prescribed tacrolimus ointment  - Advised against regular steroid cream use  - Advised daily Zyrtec or Xyzal if drowsiness occurs  - Allergy testing may be considered  - Referred to [facility] for further evaluation and potential skin scrapings  - If rash worsens with steroid creams, switch to tacrolimus    Follow-up  - Referral coordinator to reach out to [facility]  - Patient to call [facility] in a week to check on referral status

## 2025-02-04 NOTE — PROGRESS NOTES
fail to improve.     Neeta Antony, APRN - NP    ADDITIONAL EDUCATION    Last 7 days of labs:    No visits with results within 1 Week(s) from this visit.   Latest known visit with results is:   Orders Only on 10/04/2024   Component Date Value Ref Range Status    Magnesium 10/04/2024 1.7 (L)  1.8 - 2.4 mg/dL Final    Cholesterol, Total 10/04/2024 146  0 - 200 MG/DL Final    Comment: Borderline High: 200-239 mg/dL  High: Greater than or equal to 240 mg/dL      Triglycerides 10/04/2024 116  0 - 150 MG/DL Final    Comment: Borderline High: 150-199 mg/dL, High: 200-499 mg/dL  Very High: Greater than or equal to 500 mg/dL      HDL 10/04/2024 42  40 - 60 MG/DL Final    LDL Cholesterol 10/04/2024 81  0 - 100 MG/DL Final    Comment: Near Optimal: 100-129 mg/dL  Borderline High: 130-159, High: 160-189 mg/dL  Very High: Greater than or equal to 190 mg/dL      VLDL Cholesterol Calculated 10/04/2024 23  6 - 23 MG/DL Final    Chol/HDL Ratio 10/04/2024 3.5  0.0 - 5.0   Final    Sodium 10/04/2024 141  136 - 145 mmol/L Final    Potassium 10/04/2024 4.2  3.5 - 5.1 mmol/L Final    Chloride 10/04/2024 101  98 - 107 mmol/L Final    CO2 10/04/2024 28  20 - 28 mmol/L Final    Anion Gap 10/04/2024 12  9 - 18 mmol/L Final    Glucose 10/04/2024 110 (H)  70 - 99 mg/dL Final    Comment: <70 mg/dL Consistent with, but not fully diagnostic of hypoglycemia.  100 - 125 mg/dL Impaired fasting glucose/consistent with pre-diabetes mellitus.  > 126 mg/dl Fasting glucose consistent with overt diabetes mellitus      BUN 10/04/2024 8  6 - 23 MG/DL Final    Creatinine 10/04/2024 0.64 (L)  0.80 - 1.30 MG/DL Final    Est, Glom Filt Rate 10/04/2024 >90  >60 ml/min/1.73m2 Final    Comment:   Pediatric calculator link: https://www.kidney.org/professionals/kdoqi/gfr_calculatorped    These results are not intended for use in patients <18 years of age.    eGFR results are calculated without a race factor using  the 2021 CKD-EPI equation. Careful clinical

## 2025-03-24 DIAGNOSIS — E55.9 VITAMIN D DEFICIENCY: ICD-10-CM

## 2025-03-24 RX ORDER — ERGOCALCIFEROL 1.25 MG/1
50000 CAPSULE, LIQUID FILLED ORAL WEEKLY
Qty: 12 CAPSULE | Refills: 1 | Status: SHIPPED | OUTPATIENT
Start: 2025-03-24